# Patient Record
Sex: FEMALE | Race: WHITE | NOT HISPANIC OR LATINO | Employment: FULL TIME | URBAN - METROPOLITAN AREA
[De-identification: names, ages, dates, MRNs, and addresses within clinical notes are randomized per-mention and may not be internally consistent; named-entity substitution may affect disease eponyms.]

---

## 2020-04-19 ENCOUNTER — OFFICE VISIT (OUTPATIENT)
Dept: URGENT CARE | Facility: CLINIC | Age: 40
End: 2020-04-19

## 2020-04-19 VITALS
WEIGHT: 149.94 LBS | OXYGEN SATURATION: 97 % | TEMPERATURE: 99 F | BODY MASS INDEX: 21.47 KG/M2 | HEIGHT: 70 IN | HEART RATE: 90 BPM

## 2020-04-19 DIAGNOSIS — F90.9 ATTENTION DEFICIT HYPERACTIVITY DISORDER (ADHD), UNSPECIFIED ADHD TYPE: ICD-10-CM

## 2020-04-19 DIAGNOSIS — E03.9 HYPOTHYROIDISM, UNSPECIFIED TYPE: Primary | ICD-10-CM

## 2020-04-19 PROCEDURE — 99202 PR OFFICE/OUTPT VISIT, NEW, LEVL II, 15-29 MIN: ICD-10-PCS | Mod: S$GLB,,, | Performed by: STUDENT IN AN ORGANIZED HEALTH CARE EDUCATION/TRAINING PROGRAM

## 2020-04-19 PROCEDURE — 99202 OFFICE O/P NEW SF 15 MIN: CPT | Mod: S$GLB,,, | Performed by: STUDENT IN AN ORGANIZED HEALTH CARE EDUCATION/TRAINING PROGRAM

## 2020-04-19 RX ORDER — METHYLPHENIDATE HYDROCHLORIDE 20 MG/1
20 TABLET ORAL DAILY
Qty: 30 TABLET | Refills: 0 | Status: SHIPPED | OUTPATIENT
Start: 2020-04-19 | End: 2020-05-19

## 2020-04-19 RX ORDER — METHYLPHENIDATE HYDROCHLORIDE 20 MG/1
20 TABLET ORAL 2 TIMES DAILY
COMMUNITY
End: 2020-04-19 | Stop reason: SDUPTHER

## 2020-04-19 RX ORDER — LEVOTHYROXINE SODIUM 100 UG/1
100 TABLET ORAL
Qty: 30 TABLET | Refills: 0 | Status: SHIPPED | OUTPATIENT
Start: 2020-04-19 | End: 2020-05-19

## 2020-04-19 RX ORDER — LEVOTHYROXINE SODIUM 100 UG/1
100 TABLET ORAL
COMMUNITY
End: 2020-04-19 | Stop reason: SDUPTHER

## 2020-04-19 NOTE — PROGRESS NOTES
"Subjective:       Patient ID: Sana Burch is a 39 y.o. female.    Vitals:  height is 5' 10" (1.778 m) and weight is 68 kg (149 lb 14.6 oz). Her tympanic temperature is 99 °F (37.2 °C). Her pulse is 90. Her oxygen saturation is 97%.     Chief Complaint: Medication Refill    Patient presents in need of a medication refill. Patient states she is not able to travel back home due to the pandemic. She is out of her levothyroxine and ritalin, normally filled by PCP in [place]. Pt presents with prescription bottles and prior printed rx receipts with physician name/number consistent with dates of history of travel. Denied symptoms of hypo or hyperthyroidism, denied depression, anxiety, SI, HI, or AVTH.    Medication Refill   This is a new problem. The current episode started today. The problem occurs constantly. The problem has been unchanged. Pertinent negatives include no abdominal pain, arthralgias, chest pain, chills, congestion, coughing, fatigue, fever, headaches, joint swelling, myalgias, nausea, numbness, rash, sore throat, vertigo, vomiting or weakness. Nothing aggravates the symptoms. She has tried nothing for the symptoms. The treatment provided no relief.       Constitution: Negative for activity change, appetite change, chills, fatigue and fever.   HENT: Negative for congestion and sore throat.    Neck: Negative for painful lymph nodes.   Cardiovascular: Negative for chest pain, leg swelling, palpitations and sob on exertion.   Eyes: Negative for eye pain, double vision and blurred vision.   Respiratory: Negative for cough and shortness of breath.    Gastrointestinal: Negative for abdominal pain, nausea, vomiting, constipation and diarrhea.   Endocrine: hair loss, cold intolerance, heat intolerance and excessive thirst.   Musculoskeletal: Negative for joint pain, joint swelling, muscle cramps and muscle ache.   Skin: Negative for color change, pale, rash and bruising.   Allergic/Immunologic: Negative for " "seasonal allergies.   Neurological: Negative for dizziness, history of vertigo, light-headedness, passing out, headaches, altered mental status, numbness and tingling.   Hematologic/Lymphatic: Negative for swollen lymph nodes.   Psychiatric/Behavioral: Positive for history of mental illness (ADHD). Negative for altered mental status, confusion, nervous/anxious, sleep disturbance, hallucinations, homicidal ideas, suicidal ideas, substance abuse and depression. The patient is not nervous/anxious.        Objective:       Vitals:    04/19/20 1618   Pulse: 90   Temp: 99 °F (37.2 °C)   TempSrc: Tympanic   SpO2: 97%   Weight: 68 kg (149 lb 14.6 oz)   Height: 5' 10" (1.778 m)     Physical Exam   Constitutional: She is oriented to person, place, and time. She appears well-developed and well-nourished. No distress.   HENT:   Head: Normocephalic and atraumatic.   Right Ear: Hearing and external ear normal.   Left Ear: Hearing and external ear normal.   Nose: Nose normal.   Mouth/Throat: Mucous membranes are normal.   Eyes: Conjunctivae, EOM and lids are normal. Right eye exhibits no discharge. Left eye exhibits no discharge. No scleral icterus.   Neck: Normal range of motion. Neck supple.   Cardiovascular: Normal rate, regular rhythm and normal heart sounds.   No murmur heard.  Pulmonary/Chest: Effort normal and breath sounds normal. No respiratory distress. She has no wheezes.   Abdominal: Normal appearance.   Musculoskeletal: Normal range of motion. She exhibits no edema or deformity.   Neurological: She is alert and oriented to person, place, and time. No cranial nerve deficit (CN II-XII grossly intact).   Skin: Skin is warm, dry, not pale and no rash.   Psychiatric: She has a normal mood and affect. Her speech is normal and behavior is normal. Judgment and thought content normal. Cognition and memory are normal.   Nursing note and vitals reviewed.        Assessment:       1. Hypothyroidism, unspecified type    2. Attention " deficit hyperactivity disorder (ADHD), unspecified ADHD type        Plan:         Hypothyroidism, unspecified type  -     levothyroxine (SYNTHROID) 100 MCG tablet; Take 1 tablet (100 mcg total) by mouth before breakfast.  Dispense: 30 tablet; Refill: 0    Attention deficit hyperactivity disorder (ADHD), unspecified ADHD type  -     methylphenidate HCl (RITALIN) 20 MG tablet; Take 1 tablet (20 mg total) by mouth once daily.  Dispense: 30 tablet; Refill: 0  -  reviewed; no history of narcotic prescriptions in database since 2018 in [place], [place], [place], [place], [place], [place]    Counseled patient that refills of chronic medications, especially controlled substances, typically not done in UC setting. As [place] and it is unclear when pandemic restrictions will be lifted, instructed to contact insurance company for primary care referral for next month for further refills.     Medications and diagnosis reviewed with patient, questions answered, and return precautions given.    Follow up in about 1 month (around 5/19/2020) for further refills with PCP as referred by health insurance company.    Dank Westbrook MD/MPH  [facility]  [facility]

## 2020-04-19 NOTE — PATIENT INSTRUCTIONS
Your medication has been refilled today for 1 month due to extenuating circumstances caused by the novel coronavirus pandemic. Contact your "[employer] health insurance company after today's visit to establish care with a primary care physician for next month for further refills and management as the pandemic may extend beyond the next 30 days.